# Patient Record
Sex: FEMALE | Race: WHITE | NOT HISPANIC OR LATINO | URBAN - METROPOLITAN AREA
[De-identification: names, ages, dates, MRNs, and addresses within clinical notes are randomized per-mention and may not be internally consistent; named-entity substitution may affect disease eponyms.]

---

## 2019-03-07 ENCOUNTER — IMPORTED ENCOUNTER (OUTPATIENT)
Dept: URBAN - METROPOLITAN AREA CLINIC 31 | Facility: CLINIC | Age: 49
End: 2019-03-07

## 2019-03-07 PROBLEM — H04.123: Noted: 2019-03-07

## 2019-03-07 PROCEDURE — 92310 CONTACT LENS FITTING OU: CPT

## 2019-03-07 PROCEDURE — 92004 COMPRE OPH EXAM NEW PT 1/>: CPT

## 2019-03-07 NOTE — PATIENT DISCUSSION
Refractive error - order trial Clariti Dailies Total 1 and Ultra MFs. To be seen when all three in to see which gets best [address] and samina.

## 2019-03-07 NOTE — PATIENT DISCUSSION
1.  Dry Eye OU:  Switch to Refresh Repair at least TID. Discussed plugs Restasis and Xiidra as options. 2.  Refractive error - order trial Clariti Dailies Total 1 and Ultra MFs. To be seen when all three in to see which gets best [address] and comfort. 3. Return for an appointment in 1 year for comprehensive exam. with Dr. Elinor Martinez.

## 2019-04-04 ENCOUNTER — IMPORTED ENCOUNTER (OUTPATIENT)
Dept: URBAN - METROPOLITAN AREA CLINIC 31 | Facility: CLINIC | Age: 49
End: 2019-04-04

## 2019-04-04 NOTE — PATIENT DISCUSSION
1.  Refractive error - Best VA comfort and fit with Ultra MFs. Dispense as DW X 1M. Can order if satisfied. 2. Return for an appointment in 1 year for comprehensive exam. with Dr. Kimberly Lira.

## 2021-11-16 ENCOUNTER — IMPORTED ENCOUNTER (OUTPATIENT)
Dept: URBAN - METROPOLITAN AREA CLINIC 31 | Facility: CLINIC | Age: 51
End: 2021-11-16

## 2021-11-16 PROBLEM — H04.123: Noted: 2021-11-16

## 2021-11-16 PROCEDURE — 92015 DETERMINE REFRACTIVE STATE: CPT

## 2021-11-16 PROCEDURE — 92310 CONTACT LENS FITTING OU: CPT

## 2021-11-16 PROCEDURE — 92014 COMPRE OPH EXAM EST PT 1/>: CPT

## 2021-11-16 NOTE — PATIENT DISCUSSION
Refractive error - Was unsuccessful with MFs last try. Order trial MFs again but with medium and low adds to see if can improve distance and yet get some near. Pt OK wearing readers over PRN. Try Dailies Total 1 first with medium add then try AV Oasys with medium add and see if either gives good distance with some near. If distance still too blurred then try Oasys with low add to see if prefers. Will have to use readers with that more often. To be seen at dispense.

## 2021-12-16 ENCOUNTER — IMPORTED ENCOUNTER (OUTPATIENT)
Dept: URBAN - METROPOLITAN AREA CLINIC 31 | Facility: CLINIC | Age: 51
End: 2021-12-16

## 2021-12-16 NOTE — PATIENT DISCUSSION
Best fita and distance VA with AV Oasys low add OU. Dispense and can order if satisfied. DW X 2W. Understands will still need readers for PRN use. DC and call if any problems.

## 2022-04-01 ASSESSMENT — VISUAL ACUITY
OS_CC: 20/100
OD_CC: 20/150
OD_SC: 20/20
OS_SC: 20/20
OS_CC: 20/150
OD_CC: 20/200
OU_CC: 20/80
OU_SC: 20/20

## 2022-04-01 ASSESSMENT — TONOMETRY
OD_IOP_MMHG: 14
OD_IOP_MMHG: 16
OS_IOP_MMHG: 13
OS_IOP_MMHG: 15

## 2022-11-17 ENCOUNTER — COMPREHENSIVE EXAM (OUTPATIENT)
Dept: URBAN - METROPOLITAN AREA CLINIC 29 | Facility: CLINIC | Age: 52
End: 2022-11-17

## 2022-11-17 DIAGNOSIS — H52.203: ICD-10-CM

## 2022-11-17 DIAGNOSIS — H52.13: ICD-10-CM

## 2022-11-17 PROCEDURE — 92015 DETERMINE REFRACTIVE STATE: CPT

## 2022-11-17 PROCEDURE — 92014 COMPRE OPH EXAM EST PT 1/>: CPT

## 2022-11-17 PROCEDURE — 92310-2 LEVEL 2 CONTACT LENS MANAGEMENT

## 2022-11-17 ASSESSMENT — TONOMETRY
OD_IOP_MMHG: 15
OS_IOP_MMHG: 15

## 2022-11-17 ASSESSMENT — VISUAL ACUITY
OD_SC: J1+
OS_CC: 20/25-1

## 2024-04-09 ENCOUNTER — COMPREHENSIVE EXAM (OUTPATIENT)
Dept: URBAN - METROPOLITAN AREA CLINIC 29 | Facility: CLINIC | Age: 54
End: 2024-04-09

## 2024-04-09 DIAGNOSIS — H52.13: ICD-10-CM

## 2024-04-09 DIAGNOSIS — H52.203: ICD-10-CM

## 2024-04-09 PROCEDURE — 92014 COMPRE OPH EXAM EST PT 1/>: CPT

## 2024-04-09 PROCEDURE — 92310-2 LEVEL 2 CONTACT LENS MANAGEMENT: Mod: 21

## 2024-04-09 PROCEDURE — 92015 DETERMINE REFRACTIVE STATE: CPT

## 2024-04-09 ASSESSMENT — VISUAL ACUITY
OS_CC: 20/25 W/ GLASSES
OD_CC: 20/20 W/ GLASSES
OU_SC: 20/20

## 2024-04-09 ASSESSMENT — TONOMETRY
OS_IOP_MMHG: 14
OD_IOP_MMHG: 14